# Patient Record
Sex: FEMALE | Race: WHITE | Employment: UNEMPLOYED | URBAN - METROPOLITAN AREA
[De-identification: names, ages, dates, MRNs, and addresses within clinical notes are randomized per-mention and may not be internally consistent; named-entity substitution may affect disease eponyms.]

---

## 2017-03-07 ENCOUNTER — OFFICE VISIT (OUTPATIENT)
Dept: PRIMARY CARE CLINIC | Age: 37
End: 2017-03-07

## 2017-03-07 VITALS
SYSTOLIC BLOOD PRESSURE: 122 MMHG | DIASTOLIC BLOOD PRESSURE: 80 MMHG | BODY MASS INDEX: 42.14 KG/M2 | WEIGHT: 229 LBS | HEIGHT: 62 IN

## 2017-03-07 DIAGNOSIS — F41.9 ANXIETY: Primary | ICD-10-CM

## 2017-03-07 DIAGNOSIS — Z13.1 SCREENING FOR DIABETES MELLITUS: ICD-10-CM

## 2017-03-07 DIAGNOSIS — E78.00 HYPERCHOLESTEROLEMIA: ICD-10-CM

## 2017-03-07 LAB
CHOLESTEROL, TOTAL: 181 MG/DL (ref 0–199)
HDLC SERPL-MCNC: 37 MG/DL (ref 40–60)
LDL CHOLESTEROL CALCULATED: 122 MG/DL
TRIGL SERPL-MCNC: 108 MG/DL (ref 0–150)
VLDLC SERPL CALC-MCNC: 22 MG/DL

## 2017-03-07 PROCEDURE — G8427 DOCREV CUR MEDS BY ELIG CLIN: HCPCS | Performed by: INTERNAL MEDICINE

## 2017-03-07 PROCEDURE — G8484 FLU IMMUNIZE NO ADMIN: HCPCS | Performed by: INTERNAL MEDICINE

## 2017-03-07 PROCEDURE — 99214 OFFICE O/P EST MOD 30 MIN: CPT | Performed by: INTERNAL MEDICINE

## 2017-03-07 PROCEDURE — G8417 CALC BMI ABV UP PARAM F/U: HCPCS | Performed by: INTERNAL MEDICINE

## 2017-03-07 PROCEDURE — 1036F TOBACCO NON-USER: CPT | Performed by: INTERNAL MEDICINE

## 2017-03-07 RX ORDER — CITALOPRAM 10 MG/1
10 TABLET ORAL DAILY
Qty: 30 TABLET | Refills: 1 | Status: SHIPPED | OUTPATIENT
Start: 2017-03-07 | End: 2018-02-12

## 2017-03-08 ENCOUNTER — TELEPHONE (OUTPATIENT)
Dept: PRIMARY CARE CLINIC | Age: 37
End: 2017-03-08

## 2017-03-08 DIAGNOSIS — R73.03 PREDIABETES: ICD-10-CM

## 2017-03-08 LAB
ESTIMATED AVERAGE GLUCOSE: 131.2 MG/DL
HBA1C MFR BLD: 6.2 %

## 2017-03-23 RX ORDER — BUPROPION HYDROCHLORIDE 300 MG/1
TABLET ORAL
Qty: 90 TABLET | Refills: 0 | Status: SHIPPED | OUTPATIENT
Start: 2017-03-23 | End: 2017-07-25 | Stop reason: SDUPTHER

## 2017-07-25 RX ORDER — BUPROPION HYDROCHLORIDE 300 MG/1
TABLET ORAL
Qty: 90 TABLET | Refills: 1 | Status: SHIPPED | OUTPATIENT
Start: 2017-07-25 | End: 2018-02-12 | Stop reason: SDUPTHER

## 2018-02-12 ENCOUNTER — OFFICE VISIT (OUTPATIENT)
Dept: PRIMARY CARE CLINIC | Age: 38
End: 2018-02-12

## 2018-02-12 VITALS
DIASTOLIC BLOOD PRESSURE: 84 MMHG | HEART RATE: 64 BPM | TEMPERATURE: 98 F | BODY MASS INDEX: 39.43 KG/M2 | WEIGHT: 215.6 LBS | SYSTOLIC BLOOD PRESSURE: 102 MMHG

## 2018-02-12 DIAGNOSIS — N39.0 URINARY TRACT INFECTION WITHOUT HEMATURIA, SITE UNSPECIFIED: Primary | ICD-10-CM

## 2018-02-12 LAB
BILIRUBIN, POC: NORMAL
BLOOD URINE, POC: NORMAL
CLARITY, POC: NORMAL
COLOR, POC: YELLOW
GLUCOSE URINE, POC: NORMAL
KETONES, POC: NORMAL
LEUKOCYTE EST, POC: NORMAL
NITRITE, POC: NORMAL
PH, POC: 5
PROTEIN, POC: NORMAL
SPECIFIC GRAVITY, POC: 1.02
UROBILINOGEN, POC: NORMAL

## 2018-02-12 PROCEDURE — G8428 CUR MEDS NOT DOCUMENT: HCPCS | Performed by: INTERNAL MEDICINE

## 2018-02-12 PROCEDURE — 99214 OFFICE O/P EST MOD 30 MIN: CPT | Performed by: INTERNAL MEDICINE

## 2018-02-12 PROCEDURE — 1036F TOBACCO NON-USER: CPT | Performed by: INTERNAL MEDICINE

## 2018-02-12 PROCEDURE — 81002 URINALYSIS NONAUTO W/O SCOPE: CPT | Performed by: INTERNAL MEDICINE

## 2018-02-12 PROCEDURE — G8417 CALC BMI ABV UP PARAM F/U: HCPCS | Performed by: INTERNAL MEDICINE

## 2018-02-12 PROCEDURE — G8484 FLU IMMUNIZE NO ADMIN: HCPCS | Performed by: INTERNAL MEDICINE

## 2018-02-12 RX ORDER — SULFAMETHOXAZOLE AND TRIMETHOPRIM 800; 160 MG/1; MG/1
TABLET ORAL
Qty: 6 TABLET | Refills: 0 | Status: SHIPPED | OUTPATIENT
Start: 2018-02-12 | End: 2018-11-30

## 2018-02-12 RX ORDER — BUPROPION HYDROCHLORIDE 300 MG/1
TABLET ORAL
Qty: 90 TABLET | Refills: 3 | Status: SHIPPED | OUTPATIENT
Start: 2018-02-12

## 2018-02-12 RX ORDER — PHENAZOPYRIDINE HYDROCHLORIDE 200 MG/1
200 TABLET, FILM COATED ORAL 3 TIMES DAILY PRN
Qty: 6 TABLET | Refills: 0 | Status: SHIPPED | OUTPATIENT
Start: 2018-02-12 | End: 2018-02-15

## 2018-02-12 NOTE — PROGRESS NOTES
Char Barr is a 42 y.o. female presenting today with c/o    HPI:  Patient complains of a 2 day(s) history of dysuria, frequency and suprapubic pressure. She denies urinary incontinence, fever, nausea/vomiting, vaginal symptoms and flank pain. Sexually active: Yes - 1 partner. Relevant medical history: none. Treatment to date: increased fluids. Review of Systems - comprehensive review of systems negative except as noted in HPI    No Known Allergies    Past Medical History:   Diagnosis Date    Depression     anxiety    Fracture     left arm closed reduction    Headaches, tension-type     Postpartum depression     Prediabetes 3/8/2017    Thyroid activity decreased     hyperthyroid  but  dr watching post pregnancy       Past Surgical History:   Procedure Laterality Date     SECTION      FOREARM FRACTURE SURGERY  10/2011    OTHER SURGICAL HISTORY  10/19/2011    orif r promixal ulna and distal humerus    PILONIDAL CYST EXCISION         Family History   Problem Relation Age of Onset    Thyroid Disease Father     Thyroid Disease Sister      graves    High Cholesterol Brother        Social History     Social History    Marital status:      Spouse name: N/A    Number of children: N/A    Years of education: N/A     Occupational History    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 No current facility-administered medications on file prior to visit. Vitals:    02/12/18 1340   BP: 102/84   Pulse: 64   Temp: 98 °F (36.7 °C)         Wt Readings from Last 3 Encounters:   02/12/18 215 lb 9.6 oz (97.8 kg)   03/07/17 229 lb (103.9 kg)   05/03/16 230 lb (104.3 kg)     BP Readings from Last 3 Encounters:   02/12/18 102/84   03/07/17 122/80   05/03/16 112/74         /84 (Site: Left Arm, Position: Sitting, Cuff Size: Large Adult)   Pulse 64   Temp 98 °F (36.7 °C)   Wt 215 lb 9.6 oz (97.8 kg)   BMI 39.43 kg/m²   General appearance: alert and appears stated age  Throat: lips, mucosa, and tongue normal; teeth and gums normal  Neck: no adenopathy and thyroid not enlarged, symmetric, no tenderness/mass/nodules  Back: symmetric, no curvature. ROM normal. No CVA tenderness. Lungs: clear to auscultation bilaterally  Heart: regular rate and rhythm, S1, S2 normal, no murmur, click, rub or gallop  Abdomen: soft, mild suprapubic tenderness. No masses  Skin: Skin is warm and dry. [facility]: normal mood and affect. speech is normal and behavior is normal. Judgment, cognition and memory are normal.         Assessment and Plan  1. Urinary tract infection without hematuria, site unspecified  cx sent.  Treat empirically bactrim based on last UTI  cx results 2016  Follow up worsening/persistent sx  - POCT Urinalysis no Micro  - URINE CULTURE

## 2018-02-15 LAB
ORGANISM: ABNORMAL
URINE CULTURE, ROUTINE: ABNORMAL
URINE CULTURE, ROUTINE: ABNORMAL

## 2018-02-26 RX ORDER — BUPROPION HYDROCHLORIDE 300 MG/1
TABLET ORAL
Qty: 90 TABLET | Refills: 3 | Status: SHIPPED | OUTPATIENT
Start: 2018-02-26 | End: 2018-11-30

## 2018-11-30 ENCOUNTER — OFFICE VISIT (OUTPATIENT)
Dept: ORTHOPEDIC SURGERY | Age: 38
End: 2018-11-30
Payer: COMMERCIAL

## 2018-11-30 ENCOUNTER — TELEPHONE (OUTPATIENT)
Dept: ORTHOPEDIC SURGERY | Age: 38
End: 2018-11-30

## 2018-11-30 VITALS — WEIGHT: 215.61 LBS | RESPIRATION RATE: 19 BRPM | BODY MASS INDEX: 39.68 KG/M2 | HEIGHT: 62 IN

## 2018-11-30 DIAGNOSIS — S42.411D CLOSED SUPRACONDYLAR FRACTURE OF RIGHT HUMERUS WITH ROUTINE HEALING, SUBSEQUENT ENCOUNTER: ICD-10-CM

## 2018-11-30 DIAGNOSIS — G56.21 LESION OF RIGHT ULNAR NERVE: ICD-10-CM

## 2018-11-30 DIAGNOSIS — M25.521 RIGHT ELBOW PAIN: Primary | ICD-10-CM

## 2018-11-30 PROCEDURE — G8427 DOCREV CUR MEDS BY ELIG CLIN: HCPCS | Performed by: ORTHOPAEDIC SURGERY

## 2018-11-30 PROCEDURE — 99214 OFFICE O/P EST MOD 30 MIN: CPT | Performed by: ORTHOPAEDIC SURGERY

## 2018-11-30 PROCEDURE — G8417 CALC BMI ABV UP PARAM F/U: HCPCS | Performed by: ORTHOPAEDIC SURGERY

## 2018-11-30 PROCEDURE — 1036F TOBACCO NON-USER: CPT | Performed by: ORTHOPAEDIC SURGERY

## 2018-11-30 PROCEDURE — G8484 FLU IMMUNIZE NO ADMIN: HCPCS | Performed by: ORTHOPAEDIC SURGERY

## 2018-11-30 NOTE — PROGRESS NOTES
Chief Complaint:  Elbow Pain (RT ELBOW PAIN)      History of Present of Illness: The patient returns and reports that she has continued to be very active and still plays tennis. Her original injury with elbow reconstruction was back in 2011 with ORIF of the right distal humerus intra-articular fracture and ORIF of the right ulna shaft. More recently she states about 3 weeks ago she is playing tennis and when she did a backhand she felt a painful pop in the posterior aspect of the elbow. She has had some tingling and shooting discomfort down into the ring and small fingers. As we had diagnosed her previously with a mild and chronic cubital tunnel syndrome, she is routinely able to control any tingling with activity and positional modifications. Review of Systems  Pertinent items are noted in HPI  Denies fever, chills, confusion, bowel/bladder active change. Review of systems reviewed from Patient History Form dated on 11/30/18 and available in the patient's chart under the Media tab. Examination:  On exam today the incision is nicely healed. She has full range of motion of the forearm and elbow but she lacks the final 25° of extension. Forceful extension is where she has most of her tenderness. As I palpate over the medial elbow there is no dramatic tenderness over the course of the anticipated transposed ulnar nerve at the elbow. Fingers are perfused and with only some mild tingling into the ulnar nerve distribution. There is good firing of the intrinsics. Radiology:     X-rays obtained and reviewed in office:  Views 3 views  Location right elbow  Impression x-rays demonstrate well-maintained alignment of the healed distal humerus and ulna shaft fractures. There is an anatomic alignment at the radiocapitellar and ulnohumeral joints. On the lateral view there is some ossification along the triceps insertion at the proximal ulna.   On the AP view there is the question of calcific densities within either the coronoid or the olecranon fossa. Orders Placed This Encounter   Procedures    XR ELBOW RIGHT (MIN 3 VIEWS)    CT ELBOW RIGHT WO CONTRAST     Scheduling Instructions:      [facility] [place] [id number] [facility]      [phone]            Carly Krishnan2 #:      TIME AND DATE TBD      PLEASE CALL PATIENT ONCE APPROVED TO SCHEDULE             Remember that it may take several business days to pre-cert your MRI through your insurance. Our office will contact you as soon as we have the approval.             We will not give any test results over the phone. Please call [phone] once you have your test day and time to schedule a follow up with Dr. Cooper Osorio. Order Specific Question:   Reason for exam:     Answer:   EVALUATE FOR LOOSE BODIES       Impression:  Encounter Diagnoses   Name Primary?  Right elbow pain Yes    Closed supracondylar fracture of right humerus with routine healing, subsequent encounter     Lesion of right ulnar nerve          Treatment Plan:  I'm pleased with the patient has maintained a very active lifestyle and that thus far her posttraumatic cubital tunnel syndrome has been manageable. However, based on her recent experience I have raised the question of whether she may have some posttraumatic osteophyte formation or even loose bodies that long-term could continue to give her problems. I certainly think her elbow is stable and she is not necessarily experiencing any rapidly progressing ulnar neuropathy. I've recommended we obtain at her convenience a CT scan of the right elbow to rule out any loose body. I will see her back after the CT scan to discuss the findings and options. Please note that this transcription was created using voice recognition software. Any errors are unintentional and may be due to voice recognition transcription.

## 2018-11-30 NOTE — TELEPHONE ENCOUNTER
Spoke to patient and informed them that their MRI/CT has been authorized and that they can call and schedule scan at their convenience. Also told them that they can call and schedule a f/u with Dr. Tigist Langford once they have MRI/CT scheduled, leaving at least 2-3 days for our office to receive their results.

## 2018-12-13 ENCOUNTER — OFFICE VISIT (OUTPATIENT)
Dept: ORTHOPEDIC SURGERY | Age: 38
End: 2018-12-13
Payer: COMMERCIAL

## 2018-12-13 VITALS — BODY MASS INDEX: 39.68 KG/M2 | HEIGHT: 62 IN | WEIGHT: 215.61 LBS

## 2018-12-13 DIAGNOSIS — M24.021 LOOSE BODY IN ELBOW JOINT, RIGHT: ICD-10-CM

## 2018-12-13 DIAGNOSIS — M65.9 SYNOVITIS OF ELBOW: ICD-10-CM

## 2018-12-13 DIAGNOSIS — M19.121 POST-TRAUMATIC OSTEOARTHRITIS OF RIGHT ELBOW: ICD-10-CM

## 2018-12-13 PROCEDURE — 1036F TOBACCO NON-USER: CPT | Performed by: ORTHOPAEDIC SURGERY

## 2018-12-13 PROCEDURE — 99214 OFFICE O/P EST MOD 30 MIN: CPT | Performed by: ORTHOPAEDIC SURGERY

## 2018-12-13 PROCEDURE — G8417 CALC BMI ABV UP PARAM F/U: HCPCS | Performed by: ORTHOPAEDIC SURGERY

## 2018-12-13 PROCEDURE — G8484 FLU IMMUNIZE NO ADMIN: HCPCS | Performed by: ORTHOPAEDIC SURGERY

## 2018-12-13 PROCEDURE — G8427 DOCREV CUR MEDS BY ELIG CLIN: HCPCS | Performed by: ORTHOPAEDIC SURGERY

## 2018-12-13 NOTE — PROGRESS NOTES
Primary?  Loose body in elbow joint, right     Post-traumatic osteoarthritis of right elbow     Synovitis of elbow          Treatment Plan:  Right elbow posttraumatic arthritis with elements of synovitis and posterior loose bodies. I talked with her about her options moving forward and while I do not think that these fragments are going to resolve on their own, I think she can decide electively how she would like to approach this. Discussing surgical options one could make a case for open versus arthroscopic management. I've offered her the option of an elective right elbow arthroscopy with extensive debridement of the bony structures, synovectomy, and removal of posterior loose bodies. She understands the possibility of converting to an open arthrotomy if any soft tissue changes or scar formation precludes the ability of a successful arthroscopic procedure. We would anticipate using early postop therapy. In addition I cannot promise complete restoration of full range of motion but I would be hopeful that her comfort would improve with forceful extension of the elbow. Given the anteriorly transposed ulnar nerve careful visualization for the proximal anteromedial portal would be necessary. We discussed the risks, benefits, limitations, alternatives, and postop recovery following the proposed procedure. We will begin the process of scheduling surgery if there are no other preoperative medical contraindications. Please note that this transcription was created using voice recognition software. Any errors are unintentional and may be due to voice recognition transcription.

## 2019-04-24 DIAGNOSIS — M19.121 POST-TRAUMATIC OSTEOARTHRITIS OF RIGHT ELBOW: ICD-10-CM

## 2019-04-24 DIAGNOSIS — M24.021 LOOSE BODY IN ELBOW JOINT, RIGHT: Primary | ICD-10-CM

## 2019-04-24 DIAGNOSIS — M65.9 SYNOVITIS OF ELBOW: ICD-10-CM

## 2019-04-24 DIAGNOSIS — G56.21 LESION OF RIGHT ULNAR NERVE: ICD-10-CM

## 2019-04-25 ENCOUNTER — TELEPHONE (OUTPATIENT)
Dept: ORTHOPEDIC SURGERY | Age: 39
End: 2019-04-25

## 2019-05-08 NOTE — PROGRESS NOTES
Ayla Bre    Age 34 y.o.    female    1980    MRN [id number]    Date___________   Arrival Time_____________  OR Time____________Duration____     Procedure(s):  VIDEO ARTHROSCOPY RIGHT ELBOW, EXTENSIVE DEBRIDEMENT, SYNOVECTOMY AND REMOVAL OF LOOSE BODIES  POSSIBLE OPEN ARTHROTOMY    Surgeon  ________________________________  [facility]   General   [place]       Phone [phone] (home)       [facility]  Date  [address]  Cell Work  ______________________________________________________________________________________________________________________________________________________________________________________________________________________________________________________________________________________________________________________________________________________________    PCP__________________________Phone__________________________________      H&P__________________Bringing      Chart              Epic    DOS           Called_______  EKG__________________Bringing      Chart              Epic    DOS           Called_______  LAB__________________ Bringing      Chart              Epic    DOS           Called_______  CardiacClearance _______Bringing      Chart              Epic    DOS           Called_______      Cardiologist________________________ Phone___________________________      ? Holiness concerns / Waiver on Chart            [employer]________________  ? Pre-op Instructions Given [place]          _________________________________  ? Directions to Surgery Center                          _________________________________  ? Transportation Home_______________      _________________________________  ?  Crutches/Walker__________________        _________________________________      ________Pre-op Orders   _______Transcribed    _______Wt.  ________Pharmacy          _______SCD  ______VTE     ______Beta Blocker  ________Consent

## 2019-05-08 NOTE — PROGRESS NOTES
Obstructive Sleep Apnea (DEBRA) Screening     Patient:  Eber Suazo    YOB: 1980      Medical Record #:  [id number]                     Date:  5/8/2019     1. Are you a loud and/or regular snorer? []  Yes       [x] No    2. Have you been observed to gasp or stop breathing during sleep? []  Yes       [x] No    3. Do you feel tired or groggy upon awakening or do you awaken with a headache?           []  Yes       [x] No    4. Are you often tired or fatigued during the wake time hours? []  Yes       [x] No    5. Do you fall asleep sitting, reading, watching TV or driving? []  Yes       [x] No    6. Do you often have problems with memory or concentration? []  Yes       [x] No    **If patient's score is ? 3 they are considered high risk for DEBRA. Notify the anesthesiologist of the high risk and document in focus note. Note:  If the patient's BMI is more than 35 kg m¯² , has neck circumference > 40 cm, and/or high blood pressure the risk is greater (© American Sleep Apnea Association, 2006).

## 2019-05-14 NOTE — H&P
I have reviewed the H&P and examined the patient and find no relevant changes. I have reviewed with the patient and/or family the risks, benefits, and alternatives to the procedure(s). Past Medical History:   Diagnosis Date    Depression     anxiety    Fracture ~1986    left arm closed reduction    Headaches, tension-type     Postpartum depression     Prediabetes 3/8/2017    Thyroid activity decreased     hyperthyroid  but  dr tran post pregnancy     No current facility-administered medications on file prior to encounter.       Current Outpatient Medications on File Prior to Encounter   Medication Sig Dispense Refill    buPROPion (WELLBUTRIN XL) 300 MG extended release tablet TAKE ONE TABLET BY MOUTH EVERY MORNING 90 tablet 3         AYDIN Hairston

## 2019-05-14 NOTE — OP NOTE
[facility]  Procedure Note  [address] [facility] TONYA Glez  05/15/2019    Pre-operative Diagnosis:Right Elbow posttraumatic arthritis with loose bodies and synovitis    Post-operative Diagnosis: same    Procedure:  1. Right Elbow arthroscopy with extensive debridement    2. Right Elbow arthroscopic synovectomy    3. Arthroscopic removal loose bodies Right Elbow > 7mm    Surgeon: Cindra Boas    Anesthesia:  General    Complications:  None    INDICATIONS FOR PROCEDURE: The patient has elbow pain, mechanical locking, stiffness and clinical and radiographic suggestion of arthritis, synovitis, likely loose bodies and failure to respond to conservative measures. She has the history of a complex right elbow injury with internal fixation of the distal humerus and the ulna. The patient understood well the risks, benefits, limitations, healing process, postoperative rehabilitation and chance of recurrence. DESCRIPTION OF PROCEDURE: The patient was given preoperative antibiotics and brought to the operating room. The patient was anesthetized with general anesthesia and placed in the lateral decubitus position with a bean bag and all dependent areas carefully protected. The arm was carefully held with a well-padded tourniquet and an elbow arthroscopy arm driscoll. The arm was prepped and draped in a standard fashion and the arm exsanguinated and the tourniquet elevated to 250 mmHg. The elbow was then injected with sterile lactated Ringers solution and the proximal anteromedial portal was then used with careful attention to protect any ulnar nerve. Of note, slightly increased size of portal was made at the skin for better visualization and protection of the ulnar nerve given that she had previously had transposition of the ulnar nerve during her elbow reconstruction. Visualization revealed post-traumatic fibrous tissue and extensive synovitis.  Then a separate proximal anterolateral portal was then used to perform a complete synovectomy and removal of widespread fibrous bands along the anterior capsule and radiocapitellar surface. Mild spurring was seen along the coronoid process which was also debrided. The coronoid fossa was also extensively debrided. Final pictures were taken in the anterior compartment. Next a proximal posterocentral portal was used to visualize extensive synovitis in the posterior aspect of the elbow as well as several loose bodies adherent in the fossa and embedded within fibrous tissue. Another proximal posterolateral and posterolateral portal was also used intermittently with both shaver and cordelia to perform complete synovectomy, removal of loose bodies measuring 7mm diameter and debridement and shaving and deepening of the fossa in the olecranon fossa area. The olecranon tip was then also debrided. There was some moderate osteoarthrosis and spurring on the posteromedial side which was judiciously and very carefully debrided with protection of the ulnar nerve at all times. Excellent elbow extension and flexion was then noted. Final pictures were taken and printed and the plan was for the loose bodies and synovium to be sent to Pathology. Portals were closed with nylon suture and infiltrated with 0.50% plain Marcaine. A sterile dressing and a bulky but absorbative bandage was applied and the patient was then taken to the recovery room in stable condition with good perfusion of the finger tips.       Yomi Jordan

## 2019-05-15 ENCOUNTER — HOSPITAL ENCOUNTER (OUTPATIENT)
Age: 39
Setting detail: OUTPATIENT SURGERY
Discharge: HOME | End: 2019-05-15
Attending: ORTHOPAEDIC SURGERY | Admitting: ORTHOPAEDIC SURGERY
Payer: COMMERCIAL

## 2019-05-15 ENCOUNTER — ANESTHESIA (OUTPATIENT)
Dept: OPERATING ROOM | Age: 39
End: 2019-05-15
Payer: COMMERCIAL

## 2019-05-15 ENCOUNTER — ANESTHESIA EVENT (OUTPATIENT)
Dept: OPERATING ROOM | Age: 39
End: 2019-05-15
Payer: COMMERCIAL

## 2019-05-15 VITALS
OXYGEN SATURATION: 97 % | DIASTOLIC BLOOD PRESSURE: 61 MMHG | SYSTOLIC BLOOD PRESSURE: 126 MMHG | TEMPERATURE: 97.7 F | RESPIRATION RATE: 4 BRPM

## 2019-05-15 VITALS
OXYGEN SATURATION: 97 % | RESPIRATION RATE: 16 BRPM | WEIGHT: 210 LBS | HEIGHT: 62 IN | TEMPERATURE: 96.9 F | DIASTOLIC BLOOD PRESSURE: 82 MMHG | BODY MASS INDEX: 38.64 KG/M2 | SYSTOLIC BLOOD PRESSURE: 119 MMHG | HEART RATE: 66 BPM

## 2019-05-15 DIAGNOSIS — M65.9 SYNOVITIS OF ELBOW: ICD-10-CM

## 2019-05-15 DIAGNOSIS — M19.121 POST-TRAUMATIC OSTEOARTHRITIS OF RIGHT ELBOW: ICD-10-CM

## 2019-05-15 DIAGNOSIS — M24.021 LOOSE BODY OF RIGHT ELBOW: ICD-10-CM

## 2019-05-15 DIAGNOSIS — M24.021 LOOSE BODY IN ELBOW JOINT, RIGHT: Primary | ICD-10-CM

## 2019-05-15 LAB — PREGNANCY, URINE: NEGATIVE

## 2019-05-15 PROCEDURE — 2580000003 HC RX 258: Performed by: ORTHOPAEDIC SURGERY

## 2019-05-15 PROCEDURE — 3700000000 HC ANESTHESIA ATTENDED CARE: Performed by: ORTHOPAEDIC SURGERY

## 2019-05-15 PROCEDURE — 6360000002 HC RX W HCPCS: Performed by: NURSE ANESTHETIST, CERTIFIED REGISTERED

## 2019-05-15 PROCEDURE — 2500000003 HC RX 250 WO HCPCS: Performed by: NURSE ANESTHETIST, CERTIFIED REGISTERED

## 2019-05-15 PROCEDURE — 88304 TISSUE EXAM BY PATHOLOGIST: CPT

## 2019-05-15 PROCEDURE — 2500000003 HC RX 250 WO HCPCS: Performed by: ORTHOPAEDIC SURGERY

## 2019-05-15 PROCEDURE — 3600000004 HC SURGERY LEVEL 4 BASE: Performed by: ORTHOPAEDIC SURGERY

## 2019-05-15 PROCEDURE — 7100000000 HC PACU RECOVERY - FIRST 15 MIN: Performed by: ORTHOPAEDIC SURGERY

## 2019-05-15 PROCEDURE — 2500000003 HC RX 250 WO HCPCS: Performed by: ANESTHESIOLOGY

## 2019-05-15 PROCEDURE — 2580000003 HC RX 258: Performed by: ANESTHESIOLOGY

## 2019-05-15 PROCEDURE — 3600000014 HC SURGERY LEVEL 4 ADDTL 15MIN: Performed by: ORTHOPAEDIC SURGERY

## 2019-05-15 PROCEDURE — 6360000002 HC RX W HCPCS: Performed by: ANESTHESIOLOGY

## 2019-05-15 PROCEDURE — 2709999900 HC NON-CHARGEABLE SUPPLY: Performed by: ORTHOPAEDIC SURGERY

## 2019-05-15 PROCEDURE — 84703 CHORIONIC GONADOTROPIN ASSAY: CPT

## 2019-05-15 PROCEDURE — 6360000002 HC RX W HCPCS: Performed by: ORTHOPAEDIC SURGERY

## 2019-05-15 PROCEDURE — 6370000000 HC RX 637 (ALT 250 FOR IP): Performed by: ANESTHESIOLOGY

## 2019-05-15 PROCEDURE — 7100000001 HC PACU RECOVERY - ADDTL 15 MIN: Performed by: ORTHOPAEDIC SURGERY

## 2019-05-15 PROCEDURE — 7100000011 HC PHASE II RECOVERY - ADDTL 15 MIN: Performed by: ORTHOPAEDIC SURGERY

## 2019-05-15 PROCEDURE — 3700000001 HC ADD 15 MINUTES (ANESTHESIA): Performed by: ORTHOPAEDIC SURGERY

## 2019-05-15 PROCEDURE — 2720000010 HC SURG SUPPLY STERILE: Performed by: ORTHOPAEDIC SURGERY

## 2019-05-15 PROCEDURE — 7100000010 HC PHASE II RECOVERY - FIRST 15 MIN: Performed by: ORTHOPAEDIC SURGERY

## 2019-05-15 RX ORDER — MEPERIDINE HYDROCHLORIDE 50 MG/ML
12.5 INJECTION INTRAMUSCULAR; INTRAVENOUS; SUBCUTANEOUS EVERY 5 MIN PRN
Status: DISCONTINUED | OUTPATIENT
Start: 2019-05-15 | End: 2019-05-15 | Stop reason: HOSPADM

## 2019-05-15 RX ORDER — HYDRALAZINE HYDROCHLORIDE 20 MG/ML
5 INJECTION INTRAMUSCULAR; INTRAVENOUS EVERY 10 MIN PRN
Status: DISCONTINUED | OUTPATIENT
Start: 2019-05-15 | End: 2019-05-15 | Stop reason: HOSPADM

## 2019-05-15 RX ORDER — PROPOFOL 10 MG/ML
INJECTION, EMULSION INTRAVENOUS PRN
Status: DISCONTINUED | OUTPATIENT
Start: 2019-05-15 | End: 2019-05-15 | Stop reason: SDUPTHER

## 2019-05-15 RX ORDER — SODIUM CHLORIDE 0.9 % (FLUSH) 0.9 %
10 SYRINGE (ML) INJECTION EVERY 12 HOURS SCHEDULED
Status: DISCONTINUED | OUTPATIENT
Start: 2019-05-15 | End: 2019-05-15 | Stop reason: HOSPADM

## 2019-05-15 RX ORDER — ONDANSETRON 2 MG/ML
4 INJECTION INTRAMUSCULAR; INTRAVENOUS PRN
Status: DISCONTINUED | OUTPATIENT
Start: 2019-05-15 | End: 2019-05-15 | Stop reason: HOSPADM

## 2019-05-15 RX ORDER — DEXAMETHASONE SODIUM PHOSPHATE 10 MG/ML
INJECTION INTRAMUSCULAR; INTRAVENOUS PRN
Status: DISCONTINUED | OUTPATIENT
Start: 2019-05-15 | End: 2019-05-15 | Stop reason: SDUPTHER

## 2019-05-15 RX ORDER — LIDOCAINE HYDROCHLORIDE 20 MG/ML
INJECTION, SOLUTION INFILTRATION; PERINEURAL PRN
Status: DISCONTINUED | OUTPATIENT
Start: 2019-05-15 | End: 2019-05-15 | Stop reason: SDUPTHER

## 2019-05-15 RX ORDER — MORPHINE SULFATE 2 MG/ML
1 INJECTION, SOLUTION INTRAMUSCULAR; INTRAVENOUS EVERY 5 MIN PRN
Status: DISCONTINUED | OUTPATIENT
Start: 2019-05-15 | End: 2019-05-15 | Stop reason: HOSPADM

## 2019-05-15 RX ORDER — PROMETHAZINE HYDROCHLORIDE 25 MG/ML
6.25 INJECTION, SOLUTION INTRAMUSCULAR; INTRAVENOUS
Status: DISCONTINUED | OUTPATIENT
Start: 2019-05-15 | End: 2019-05-15 | Stop reason: HOSPADM

## 2019-05-15 RX ORDER — ROCURONIUM BROMIDE 10 MG/ML
INJECTION, SOLUTION INTRAVENOUS PRN
Status: DISCONTINUED | OUTPATIENT
Start: 2019-05-15 | End: 2019-05-15 | Stop reason: SDUPTHER

## 2019-05-15 RX ORDER — MAGNESIUM HYDROXIDE 1200 MG/15ML
LIQUID ORAL CONTINUOUS PRN
Status: COMPLETED | OUTPATIENT
Start: 2019-05-15 | End: 2019-05-15

## 2019-05-15 RX ORDER — FENTANYL CITRATE 50 UG/ML
INJECTION, SOLUTION INTRAMUSCULAR; INTRAVENOUS PRN
Status: DISCONTINUED | OUTPATIENT
Start: 2019-05-15 | End: 2019-05-15 | Stop reason: SDUPTHER

## 2019-05-15 RX ORDER — ONDANSETRON 2 MG/ML
INJECTION INTRAMUSCULAR; INTRAVENOUS PRN
Status: DISCONTINUED | OUTPATIENT
Start: 2019-05-15 | End: 2019-05-15 | Stop reason: SDUPTHER

## 2019-05-15 RX ORDER — SODIUM CHLORIDE, SODIUM LACTATE, POTASSIUM CHLORIDE, CALCIUM CHLORIDE 600; 310; 30; 20 MG/100ML; MG/100ML; MG/100ML; MG/100ML
INJECTION, SOLUTION INTRAVENOUS CONTINUOUS
Status: DISCONTINUED | OUTPATIENT
Start: 2019-05-15 | End: 2019-05-15 | Stop reason: HOSPADM

## 2019-05-15 RX ORDER — SODIUM CHLORIDE 0.9 % (FLUSH) 0.9 %
10 SYRINGE (ML) INJECTION PRN
Status: DISCONTINUED | OUTPATIENT
Start: 2019-05-15 | End: 2019-05-15 | Stop reason: HOSPADM

## 2019-05-15 RX ORDER — ACETAMINOPHEN 500 MG
1000 TABLET ORAL ONCE
Status: CANCELLED | OUTPATIENT
Start: 2019-05-15

## 2019-05-15 RX ORDER — ONDANSETRON 4 MG/1
4 TABLET, FILM COATED ORAL EVERY 8 HOURS PRN
Qty: 10 TABLET | Refills: 1 | Status: SHIPPED | OUTPATIENT
Start: 2019-05-15

## 2019-05-15 RX ORDER — LIDOCAINE HYDROCHLORIDE 10 MG/ML
0.3 INJECTION, SOLUTION EPIDURAL; INFILTRATION; INTRACAUDAL; PERINEURAL
Status: COMPLETED | OUTPATIENT
Start: 2019-05-15 | End: 2019-05-15

## 2019-05-15 RX ORDER — MORPHINE SULFATE 2 MG/ML
2 INJECTION, SOLUTION INTRAMUSCULAR; INTRAVENOUS EVERY 5 MIN PRN
Status: DISCONTINUED | OUTPATIENT
Start: 2019-05-15 | End: 2019-05-15 | Stop reason: HOSPADM

## 2019-05-15 RX ORDER — OXYCODONE HYDROCHLORIDE AND ACETAMINOPHEN 5; 325 MG/1; MG/1
2 TABLET ORAL PRN
Status: COMPLETED | OUTPATIENT
Start: 2019-05-15 | End: 2019-05-15

## 2019-05-15 RX ORDER — MIDAZOLAM HYDROCHLORIDE 1 MG/ML
INJECTION INTRAMUSCULAR; INTRAVENOUS PRN
Status: DISCONTINUED | OUTPATIENT
Start: 2019-05-15 | End: 2019-05-15 | Stop reason: SDUPTHER

## 2019-05-15 RX ORDER — OXYCODONE HYDROCHLORIDE AND ACETAMINOPHEN 5; 325 MG/1; MG/1
1 TABLET ORAL PRN
Status: COMPLETED | OUTPATIENT
Start: 2019-05-15 | End: 2019-05-15

## 2019-05-15 RX ORDER — DIPHENHYDRAMINE HYDROCHLORIDE 50 MG/ML
12.5 INJECTION INTRAMUSCULAR; INTRAVENOUS
Status: DISCONTINUED | OUTPATIENT
Start: 2019-05-15 | End: 2019-05-15 | Stop reason: HOSPADM

## 2019-05-15 RX ORDER — IBUPROFEN 600 MG/1
600 TABLET ORAL EVERY 6 HOURS PRN
Qty: 60 TABLET | Refills: 1 | Status: SHIPPED | OUTPATIENT
Start: 2019-05-15

## 2019-05-15 RX ORDER — SODIUM CHLORIDE, SODIUM LACTATE, POTASSIUM CHLORIDE, AND CALCIUM CHLORIDE .6; .31; .03; .02 G/100ML; G/100ML; G/100ML; G/100ML
IRRIGANT IRRIGATION PRN
Status: DISCONTINUED | OUTPATIENT
Start: 2019-05-15 | End: 2019-05-15 | Stop reason: ALTCHOICE

## 2019-05-15 RX ORDER — BUPIVACAINE HYDROCHLORIDE 5 MG/ML
INJECTION, SOLUTION EPIDURAL; INTRACAUDAL PRN
Status: DISCONTINUED | OUTPATIENT
Start: 2019-05-15 | End: 2019-05-15 | Stop reason: ALTCHOICE

## 2019-05-15 RX ORDER — OXYCODONE HYDROCHLORIDE AND ACETAMINOPHEN 5; 325 MG/1; MG/1
1 TABLET ORAL EVERY 6 HOURS PRN
Qty: 20 TABLET | Refills: 0 | Status: SHIPPED | OUTPATIENT
Start: 2019-05-15 | End: 2019-05-22

## 2019-05-15 RX ORDER — LABETALOL HYDROCHLORIDE 5 MG/ML
5 INJECTION, SOLUTION INTRAVENOUS EVERY 10 MIN PRN
Status: DISCONTINUED | OUTPATIENT
Start: 2019-05-15 | End: 2019-05-15 | Stop reason: HOSPADM

## 2019-05-15 RX ADMIN — PROPOFOL 200 MG: 10 INJECTION, EMULSION INTRAVENOUS at 08:55

## 2019-05-15 RX ADMIN — FENTANYL CITRATE 100 MCG: 50 INJECTION, SOLUTION INTRAMUSCULAR; INTRAVENOUS at 08:48

## 2019-05-15 RX ADMIN — ROCURONIUM BROMIDE 30 MG: 10 SOLUTION INTRAVENOUS at 08:55

## 2019-05-15 RX ADMIN — ONDANSETRON 4 MG: 2 INJECTION INTRAMUSCULAR; INTRAVENOUS at 08:44

## 2019-05-15 RX ADMIN — LIDOCAINE HYDROCHLORIDE 0.1 ML: 10 INJECTION, SOLUTION EPIDURAL; INFILTRATION; INTRACAUDAL; PERINEURAL at 07:30

## 2019-05-15 RX ADMIN — HYDROMORPHONE HYDROCHLORIDE 0.5 MG: 1 INJECTION, SOLUTION INTRAMUSCULAR; INTRAVENOUS; SUBCUTANEOUS at 10:25

## 2019-05-15 RX ADMIN — FENTANYL CITRATE 50 MCG: 50 INJECTION, SOLUTION INTRAMUSCULAR; INTRAVENOUS at 09:07

## 2019-05-15 RX ADMIN — SODIUM CHLORIDE, POTASSIUM CHLORIDE, SODIUM LACTATE AND CALCIUM CHLORIDE: 600; 310; 30; 20 INJECTION, SOLUTION INTRAVENOUS at 09:48

## 2019-05-15 RX ADMIN — SUGAMMADEX 191 MG: 100 INJECTION, SOLUTION INTRAVENOUS at 09:50

## 2019-05-15 RX ADMIN — SODIUM CHLORIDE, POTASSIUM CHLORIDE, SODIUM LACTATE AND CALCIUM CHLORIDE: 600; 310; 30; 20 INJECTION, SOLUTION INTRAVENOUS at 07:48

## 2019-05-15 RX ADMIN — MIDAZOLAM HYDROCHLORIDE 2 MG: 2 INJECTION, SOLUTION INTRAMUSCULAR; INTRAVENOUS at 08:44

## 2019-05-15 RX ADMIN — LIDOCAINE HYDROCHLORIDE 60 MG: 20 INJECTION, SOLUTION INFILTRATION; PERINEURAL at 08:51

## 2019-05-15 RX ADMIN — HYDROMORPHONE HYDROCHLORIDE 0.5 MG: 1 INJECTION, SOLUTION INTRAMUSCULAR; INTRAVENOUS; SUBCUTANEOUS at 10:32

## 2019-05-15 RX ADMIN — Medication 2 G: at 09:00

## 2019-05-15 RX ADMIN — DEXAMETHASONE SODIUM PHOSPHATE 10 MG: 10 INJECTION INTRAMUSCULAR; INTRAVENOUS at 09:02

## 2019-05-15 RX ADMIN — OXYCODONE HYDROCHLORIDE AND ACETAMINOPHEN 2 TABLET: 5; 325 TABLET ORAL at 10:41

## 2019-05-15 RX ADMIN — FENTANYL CITRATE 25 MCG: 50 INJECTION, SOLUTION INTRAMUSCULAR; INTRAVENOUS at 09:48

## 2019-05-15 ASSESSMENT — PULMONARY FUNCTION TESTS
PIF_VALUE: 15
PIF_VALUE: 7
PIF_VALUE: 17
PIF_VALUE: 15
PIF_VALUE: 10
PIF_VALUE: 5
PIF_VALUE: 17
PIF_VALUE: 15
PIF_VALUE: 25
PIF_VALUE: 2
PIF_VALUE: 1
PIF_VALUE: 26
PIF_VALUE: 14
PIF_VALUE: 2
PIF_VALUE: 23
PIF_VALUE: 1
PIF_VALUE: 19
PIF_VALUE: 18
PIF_VALUE: 17
PIF_VALUE: 15
PIF_VALUE: 17
PIF_VALUE: 15
PIF_VALUE: 15
PIF_VALUE: 2
PIF_VALUE: 15
PIF_VALUE: 15
PIF_VALUE: 27
PIF_VALUE: 20
PIF_VALUE: 15
PIF_VALUE: 19
PIF_VALUE: 2
PIF_VALUE: 23
PIF_VALUE: 18
PIF_VALUE: 17
PIF_VALUE: 25
PIF_VALUE: 15
PIF_VALUE: 22
PIF_VALUE: 15
PIF_VALUE: 15
PIF_VALUE: 2
PIF_VALUE: 2
PIF_VALUE: 15
PIF_VALUE: 15
PIF_VALUE: 19
PIF_VALUE: 15
PIF_VALUE: 24
PIF_VALUE: 15
PIF_VALUE: 0
PIF_VALUE: 10
PIF_VALUE: 15
PIF_VALUE: 15
PIF_VALUE: 2
PIF_VALUE: 24
PIF_VALUE: 15
PIF_VALUE: 15
PIF_VALUE: 2
PIF_VALUE: 22
PIF_VALUE: 15
PIF_VALUE: 17
PIF_VALUE: 2
PIF_VALUE: 2
PIF_VALUE: 1
PIF_VALUE: 15
PIF_VALUE: 15
PIF_VALUE: 1
PIF_VALUE: 2
PIF_VALUE: 15
PIF_VALUE: 14
PIF_VALUE: 15
PIF_VALUE: 17
PIF_VALUE: 1
PIF_VALUE: 15
PIF_VALUE: 17

## 2019-05-15 ASSESSMENT — PAIN - FUNCTIONAL ASSESSMENT: PAIN_FUNCTIONAL_ASSESSMENT: 0-10

## 2019-05-15 ASSESSMENT — PAIN SCALES - GENERAL
PAINLEVEL_OUTOF10: 7
PAINLEVEL_OUTOF10: 5
PAINLEVEL_OUTOF10: 7
PAINLEVEL_OUTOF10: 7

## 2019-05-15 NOTE — ANESTHESIA PRE PROCEDURE
humerus S42.413A    Pain in joint, upper arm M25.529    Pain in joint, hand M25.549    Lesion of ulnar nerve G56.20    Prediabetes R73.03    Loose body in elbow joint, right M24.021    Post-traumatic osteoarthritis of right elbow M19.121    Synovitis of elbow M65.9       Past Medical History:        Diagnosis Date    Depression     anxiety    Fracture ~    left arm closed reduction    Headaches, tension-type     Postpartum depression     Prediabetes 3/8/2017    Thyroid activity decreased     hyperthyroid  but  dr watching post pregnancy       Past Surgical History:        Procedure Laterality Date     SECTION      x 4    OTHER SURGICAL HISTORY Right 10/19/2011    orif r promixal ulna and distal humerus    PILONIDAL CYST EXCISION         Social History:    Social History     Tobacco Use    Smoking status: Never Smoker    Smokeless tobacco: Never Used   Substance Use Topics    Alcohol use: No                                Counseling given: Not Answered      Vital Signs (Current):   Vitals:    19 1140 05/15/19 0717   BP:  122/82   Pulse:  68   Resp:  16   Temp:  97.7 °F (36.5 °C)   TempSrc:  Temporal   SpO2:  97%   Weight: 210 lb (95.3 kg) 210 lb (95.3 kg)   Height: 5' 2\" (1.575 m) 5' 2\" (1.575 m)                                              BP Readings from Last 3 Encounters:   05/15/19 122/82   18 102/84   17 122/80       NPO Status: Time of last liquid consumption: 2300                        Time of last solid consumption: 2300                        Date of last liquid consumption: 19                        Date of last solid food consumption: 19    BMI:   Wt Readings from Last 3 Encounters:   05/15/19 210 lb (95.3 kg)   18 215 lb 9.8 oz (97.8 kg)   18 215 lb 9.8 oz (97.8 kg)     Body mass index is 38.41 kg/m².     CBC:   Lab Results   Component Value Date    WBC 6.8 2014    RBC 4.60 2014    HGB 13.3 2014    HCT 39.9 11/04/2014    MCV 86.7 11/04/2014    RDW 14.4 11/04/2014     11/04/2014       CMP:   Lab Results   Component Value Date     08/04/2014    K 4.0 08/04/2014     08/04/2014    CO2 25 08/04/2014    BUN 17 08/04/2014    CREATININE 0.6 08/04/2014    GFRAA >60 08/04/2014    GFRAA >60 10/15/2011    AGRATIO 1.6 08/04/2014    LABGLOM >60 08/04/2014    GLUCOSE 83 08/04/2014    PROT 7.0 08/04/2014    PROT 8.1 10/15/2011    CALCIUM 9.5 08/04/2014    BILITOT 0.3 08/04/2014    ALKPHOS 66 08/04/2014    AST 13 08/04/2014    ALT 12 08/04/2014       POC Tests: No results for input(s): POCGLU, POCNA, POCK, POCCL, POCBUN, POCHEMO, POCHCT in the last 72 hours. Coags:   Lab Results   Component Value Date    PROTIME 12.0 01/25/2011    INR 1.10 01/25/2011       HCG (If Applicable):   Lab Results   Component Value Date    PREGTESTUR neg 05/03/2016        ABGs: No results found for: PHART, PO2ART, SOE7ECR, HOR6ZBK, BEART, H1POJENA     Type & Screen (If Applicable):  Lab Results   Component Value Date    LABABO O 12/30/2013    [facility] Rh Positive 12/30/2013       Anesthesia Evaluation  Patient summary reviewed and Nursing notes reviewed  Airway: Mallampati: III  TM distance: <3 FB   Neck ROM: full  Mouth opening: > = 3 FB Dental: normal exam         Pulmonary:Negative Pulmonary ROS and normal exam  breath sounds clear to auscultation                             Cardiovascular:Negative CV ROS            Rhythm: regular  Rate: normal                    Neuro/Psych:   (+) depression/anxiety             GI/Hepatic/Renal: Neg GI/Hepatic/Renal ROS            Endo/Other:    (+) hypothyroidism::., .                 Abdominal:   (+) obese,         Vascular: negative vascular ROS. Anesthesia Plan      general     ASA 2       Induction: intravenous. MIPS: Postoperative opioids intended and Prophylactic antiemetics administered. Anesthetic plan and risks discussed with patient.       Plan discussed with CRNA.                   Anan Cook MD   5/15/2019

## 2019-05-15 NOTE — ANESTHESIA POSTPROCEDURE EVALUATION
Department of Anesthesiology  Postprocedure Note    Patient: Twila Muse  MRN: [id number]  YOB: 1980  Date of evaluation: 5/15/2019  Time:  2:14 PM     Procedure Summary     Date:  05/15/19 Room / Location:  [facility] OR 04 / [facility] OR    Anesthesia Start:  4098 Anesthesia Stop:  2786    Procedure:  VIDEO ARTHROSCOPY RIGHT ELBOW, EXTENSIVE DEBRIDEMENT, SYNOVECTOMY AND REMOVAL OF LOOSE BODIES (Right Elbow) Diagnosis:       Post-traumatic osteoarthritis of right elbow      Loose body of right elbow      Synovitis of elbow      (POST TRAUMATIC OSTEOARTHRITIS RIGHT ELBOW, LOOSE BODY IN RIGHT ELBOW, SYNOVITIS RIGHT ELBOW)    Surgeon:  Bart Browne MD Responsible Provider:  Ira Galvan MD    Anesthesia Type:  general ASA Status:  2          Anesthesia Type: general    Rosalba Phase I: Rosalba Score: 10    Rosalba Phase II: Rosalba Score: 10    Last vitals: Reviewed and per EMR flowsheets.        Anesthesia Post Evaluation    Patient location during evaluation: PACU  Patient participation: complete - patient participated  Level of consciousness: awake and alert  Pain score: 0  Airway patency: patent  Nausea & Vomiting: no nausea and no vomiting  Complications: no  Cardiovascular status: blood pressure returned to baseline  Respiratory status: acceptable  Hydration status: stable

## 2019-05-20 ENCOUNTER — HOSPITAL ENCOUNTER (OUTPATIENT)
Dept: OCCUPATIONAL THERAPY | Age: 39
Setting detail: THERAPIES SERIES
Discharge: HOME | End: 2019-05-20
Payer: COMMERCIAL

## 2019-05-20 PROCEDURE — 97165 OT EVAL LOW COMPLEX 30 MIN: CPT | Performed by: OCCUPATIONAL THERAPIST

## 2019-05-20 PROCEDURE — 97110 THERAPEUTIC EXERCISES: CPT | Performed by: OCCUPATIONAL THERAPIST

## 2019-05-20 NOTE — FLOWSHEET NOTE
[facility], [address] [place]  Phone: [phone]  Fax [phone]  Hand Therapy Daily Treatment Note  Date:  2019    Patient: Loni Gayle   : 1980   MRN: [id number]  Referring Physician: Referring Practitioner: Jyoti Bowman MD       Medical Diagnosis Information:   Diagnosis: M19.121 (ICD-10-CM) - Post-traumatic osteoarthritis of right elbow, M65.9 (ICD-10-CM) - Synovitis of elbow; G56.21 (ICD-10-CM) - Lesion of right ulnar nerve; M24.021 (ICD-10-CM) - Loose body in elbow joint, right   Treatment Diagnosis: OT:R elbow stiffness M25.621     Date of injury:fell of a horse in , surgery at that time, recently 2018, she reinjured  Date of Surgery/Type of procedure:      Elbow scope on 19                                Insurance information:OT Insurance Information:  / 116**- MED MUT - RHOAD - $6000DED - $0CP - 20PT - 100% NO AUTH   Comorbidities Affecting Functional Performance:     []Anxiety (F41.9)/Depression (F32.9)   []Diabetes Type 1(E10.65) or 2 (E11.65)   []Rheumatoid Arthritis (M05.9)  []Fibromyalgia (M79.7)  []Neuropathy(G60.9)  []Osteoarthritis(M19.91)  [x]None   []Other:    G-code:      64% QDASH SCORE APPLIED ON 19                             Date of Patient follow up with Physician:  Preferred Language for Healthcare:   [x]English       []other:  Latex Allergy:  [x]NO      []YES  RESTRICTIONS/PRECAUTIONS:   NONE  Progress Note: [x]  Yes  []  No  Next due by : Visit #10       Visit # Insurance Allowable Requires auth   1     no:[]                  yes:[]    From 19 to 2019    Pain level:  /10     SUBJECTIVE:  Patient reported deficits/history of current problem: fell of a horse in , surgery then, recently heard a pop playing tennis, a little bit of pain and stiffness, finished tennis season and then elected to have surgery.     Pain         OBJECTIVE: Reviewed/Progressed HEP activities related to strengthening, flexibility, endurance, ROM of scapular, scapulothoracic and UE control with self care, reaching, carrying, lifting, house/yardwork, driving/computer work    Manual Treatments:   [] (45274) Provided manual therapy to mobilize soft tissue/joints of the UE for the purpose of modulating pain, promoting relaxation,  increasing ROM, reducing/eliminating soft tissue swelling/inflammation/restriction, improving soft tissue extensibility and allowing for proper ROM for normal function with self care, reaching, carrying, lifting, house/yardwork, driving/computer work    Splinting:  [] Fabrication of: L-code  [] (26474) Checkout for orthotic/prosthetic use, established patient   [] (08583) Orthotic management and training (fitting and assessment)  [] Comments:    Charges:  Timed Code Treatment Minutes: 15   Total Treatment Minutes: 45   [x] EVAL (LOW) 94404   [] OT Re-eval (16541)  [] EVAL (MOD) 59615   [] EVAL (HIGH) 55802       [x] Beronica (41940) x   1  [] ZZXEO(73918)  [] NMR (29397) x     [] Estim (attended) (85242)   [] Manual ([id number]) x      [] US (59246)  [] TA (00875) x     [] Paraffin (51341)  [] ADL  (91041) x    [] Splint/L code:    [] Estim (unattended) (15177)  [] Other:  [] Fluidotherapy (54892)  [](83713) Checkout for orthotic use, established patient x      [] (32146) Orthotic mgmt & training  x       GOALS:Short Term Goals: To be achieved in: 2 weeks  1. Independent in HEP and progression per patient tolerance, in order to prevent re-injury. 2. Patient will have a decrease in pain to facilitate improvement in movement, function, and ADLs as indicated by Functional Deficits.     Long Term Goals to be achieved in 6  weeks, including patient directed goals to address identified performance deficits:  1) Pt to be independent in graded HEP progression with a good level of effort and compliance.   2) Pt to report a score of 30 % or less on the Quick DASH disability questionnaire for increased performance with carrying, moving, and handling objects. 3) Pt will demonstrate increased ROM to elbow  for improved ability to resume playing tennis, lift and carry her children. 4) Pt will demonstrate increased strength to  50% of right, MMT elbow flex functional for improved bility to resume playing tennis, lift and carry her childern. 5) Pt will have a decrease in pain to 2/10 to facilitate improvement in ability to resume tennis, complete all household duties including meal prep and cleaning. Progression Towards Functional goals:  [] Patient is progressing as expected towards functional goals listed. [] Progression is slowed due to complexities listed. [] Progression has been slowed due to co-morbidities.   [x] Plan just implemented, too soon to assess goals progression  [] Other:     ASSESSMENT:    Treatment/Activity Tolerance:  [] Patient tolerated treatment well [] Patient limited by fatique  [] Patient limited by pain  [] Patient limited by other medical complications  [] Other:     Prognosis: [] Good [] Fair  [] Poor    Patient Requires Follow-up: [x] Yes  [] No    PLAN: Recommend Occupational Therapy 1 times a week for 6 weeks (she will follow up in 2 weeks to progress HEP)  [] Continue per plan of care [] Selinda Prudent current plan (see comments)  [x] Plan of care initiated [] Hold pending MD visit [] Discharge    Plan for next session: progress HEP   Thermal modalities, functional activities and strengthening as inicated, AROM, PROM as indicated    Electronically signed by: Sue RANGEL/JOSÉ, KUSUMT [id number]

## 2019-05-20 NOTE — PLAN OF CARE
deficits/history of current problem: fell of a horse in 2011, surgery then, recently heard a pop playing tennis, a little bit of pain and stiffness, finished tennis season and then elected to have surgery.     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, stay at home mom    Recreational Activities/Meaningful Interests: TENNIS, working out    Prior Level of Function: [x] Independent with ADLs/IADLs     [] Assistance needed (describe):    Patient-Identified Primary Performance Deficits (to be addressed in POC):   [] bathing    [x] household tasks (cooking/cleaning)   [] dressing    [] self feeding   [] grooming    [] work/education   [] functional mobility   [] sleeping/rest   [] toileting/hygiene   [x] recreational activities   [] driving    [] community/social participation   [] other:     Comorbidities Affecting Functional Performance:     []Anxiety (F41.9)/Depression (F32.9)   []Diabetes Type 1(E10.65) or 2 (E11.65)   []Rheumatoid Arthritis (M05.9)  []Fibromyalgia (M79.7)  []Neuropathy(G60.9)  []Osteoarthritis(M19.91)  []None   []Other:    OBJECTIVE:   Date:  Hand Dominance:     []  Right    [] Left 5/20/2019     Objective Measures:    PAIN 6/10   Quick DASH 64%   Digits tip to DPFC in cm WNL   Thumb ROM WNL   Thumb opposition  WNL   Thumb Radial/Palmar abd ROM WNL:   Wrist ROM Ext/Flex R:WNL  L:   Rad/Uln dev ROM R:  L:   Forearm ROM  Sup/pron R:WNL  L:WNL   Elbow ROM Ext/flex R:35/110  L:+15/150   Shoulder Flex  Shoulder Abd  Shoulder IR/ER No deficits per patient   Edema in cm circumf.  elbow R:28.0  L:26.5    strength in lbs R:deferred  L:   Pinch Strengthin lbs: lat  R:deferred  L:   Pinch Strength in lbs:  3 point R:  L:     MMT: NT     Observations (including splints, bandages, incisions, scars):   Sutures intact, moderate edema noted at elbow    Sensation:  [x] No reported deficits  [] Intact to light touch    [] [place] [employer] test completed, findings as noted:  [] Other:    Palpation: NT    Functional Mobility/Transfers/Gait:  [x] Independent - no significant gait deviations  [] Assistance needed   [] Assistive device used: Falls Risk Assessment (30 days):   [x] Falls Risk assessed and no intervention required. [] Falls Risk assessed and Patient requires intervention due to being higher risk   TUG score (>12s at risk):     [] Falls education provided, including      Review Of Systems (ROS): [x]Performed Review of systems (Integumentary, CardioPulmonary, Neurological) by intake and observation. Intake form has been scanned into medical record. Patient has been instructed to contact their primary care physician regarding ROS issues if not already being addressed at this time. ASSESSMENT:   This patient presents with signs and symptoms consistent with the medical diagnosis provided by the referring physician.      Impairments (physical, cognitive and/or psychosocial):  [x] Decreased mobility   [x] Weakness    [] Hypersensitivity   [x] Pain/tenderness   [x] Edema/swelling   [] Decreased coordination (fine/gross motor)   [] Impaired body mechanics  [] Sensory loss  [] Loss of balance   [] Other:      Performance Deficits (to be addressed in plan of care):   [] Bathing    [x] Household Tasks (cooking/cleaning)   [] Dressing    [] Self Feeding   [] Grooming    [] Work/Education   [] Functional Mobility   [] Sleeping/Rest   [] Toileting/Hygiene   [x] Recreational Activities   [] Driving    [] Community/Social Participation   [] Other:     Rehab Potential:   [] Excellent [x] Good [] Fair  [] Poor     Barriers affecting rehab potential:  []Age    []Lack of Motivation   []Co-Morbidities  []Cognitive Function  []Environmental/home/work

## 2019-05-30 ENCOUNTER — OFFICE VISIT (OUTPATIENT)
Dept: ORTHOPEDIC SURGERY | Age: 39
End: 2019-05-30

## 2019-05-30 DIAGNOSIS — M19.121 POST-TRAUMATIC OSTEOARTHRITIS OF RIGHT ELBOW: Primary | ICD-10-CM

## 2019-05-30 PROCEDURE — 99024 POSTOP FOLLOW-UP VISIT: CPT | Performed by: ORTHOPAEDIC SURGERY

## 2019-05-30 NOTE — PROGRESS NOTES
Chief Complaint:  Post-Op Check (PO CK RT ELBOW SCOPE 5/15/19)      History of Present of Illness: The patient returns for the first postoperative appointment after right elbow arthroscopy. At the time of surgery she was indeed found to have some posttraumatic arthrosis mostly along the radiocapitellar joint of the elbow. However, she had a fair amount of some [employer] throughout the elbow and in the posterior aspect of the elbow she had several loose bodies and adherent osteophyte formation along the olecranon fossa. She is done very well postoperatively and did bring her images from surgery to review. Review of Systems  Pertinent items are noted in HPI  Denies fever, chills, confusion, bowel/bladder active change. Review of systems reviewed from Patient History Form dated on 11/30/2018 and available in the patient's chart under the Media tab. Examination:  And today the portal sites are healing nicely and she demonstrates a range of motion from approximately °. There is full pronation and supination. Radiology:     X-rays obtained and reviewed in office:  Views    Location    Impression      No orders of the defined types were placed in this encounter. Impression:  Encounter Diagnosis   Name Primary?  Post-traumatic osteoarthritis of right elbow Yes         Treatment Plan: Today we will go ahead and remove the sutures and apply Steri-Strips. I want to through all of the images to outline the findings. I think down the road she will be able to have a very active lifestyle that she will just simply need to find a middle ground of reasonable activity without aggravating the elbow. She does understand that with the arthritis she may always have some degree of stiffness. I will see her back for follow-up in about 6 weeks. Please note that this transcription was created using voice recognition software. Any errors are unintentional and may be due to voice recognition transcription.

## 2019-06-03 ENCOUNTER — HOSPITAL ENCOUNTER (OUTPATIENT)
Dept: OCCUPATIONAL THERAPY | Age: 39
Setting detail: THERAPIES SERIES
Discharge: HOME | End: 2019-06-03
Payer: COMMERCIAL

## 2019-06-03 PROCEDURE — 97140 MANUAL THERAPY 1/> REGIONS: CPT | Performed by: OCCUPATIONAL THERAPIST

## 2019-06-03 PROCEDURE — 97110 THERAPEUTIC EXERCISES: CPT | Performed by: OCCUPATIONAL THERAPIST

## 2019-06-03 NOTE — FLOWSHEET NOTE
[facility], [address]  Phone: [phone]  Fax [phone]  Hand Therapy Daily Treatment Note  Date:  6/3/2019    Patient: Gabby Garrison   : 1980   MRN: [id number]  Referring Physician: Referring Practitioner: Caleb Burns MD       Medical Diagnosis Information:   Diagnosis: M19.121 (ICD-10-CM) - Post-traumatic osteoarthritis of right elbow, M65.9 (ICD-10-CM) - Synovitis of elbow; G56.21 (ICD-10-CM) - Lesion of right ulnar nerve; M24.021 (ICD-10-CM) - Loose body in elbow joint, right   Treatment Diagnosis: OT:R elbow stiffness M25.621     Date of injury:fell of a horse in , surgery at that time, recently 2018, she re-injured   Date of Surgery/Type of procedure:      Elbow scope on 19                                Insurance information:OT Insurance Information:  / 116**- MED MUT - RHOAD - $6000DED - $0CP - 20PT - 100% NO AUTH   Comorbidities Affecting Functional Performance:     []Anxiety (F41.9)/Depression (F32.9)   []Diabetes Type 1(E10.65) or 2 (E11.65)   []Rheumatoid Arthritis (M05.9)  []Fibromyalgia (M79.7)  []Neuropathy(G60.9)  []Osteoarthritis(M19.91)  [x]None   []Other:    G-code:      64% QDASH SCORE APPLIED ON 19                             Date of Patient follow up with Physician:  Preferred Language for Healthcare:   [x]English       []other:  Latex Allergy:  [x]NO      []YES  RESTRICTIONS/PRECAUTIONS:   NONE  Progress Note: []  Yes  [x]  No  Next due by : Visit #10       Visit # Insurance Allowable Requires auth   2     no:[]                  yes:[]    From 19 to 6/3/2019    Pain level: 1/10     SUBJECTIVE: minimal pain today, does have a sharp pain occasionally with certain motions.       Patient reported deficits/history of current problem: fell of a horse in , surgery then, recently heard a pop playing tennis, a little bit of pain and stiffness, finished tennis season and then elected to have surgery. OBJECTIVE:   Date:  Hand Dominance:     []  Right    [] Left 5/20/2019    6/3/19    Objective Measures:      PAIN 6/10    Quick DASH 64%    Digits tip to DPFC in cm WNL    Thumb ROM WNL    Thumb opposition  WNL    Thumb Radial/Palmar abd ROM WNL:    Wrist ROM Ext/Flex R:WNL  L:    Rad/Uln dev ROM R:  L:    Forearm ROM  Sup/pron R:WNL  L:WNL    Elbow ROM Ext/flex R:35/110  L:+15/150 32/148   Shoulder Flex  Shoulder Abd  Shoulder IR/ER No deficits per patient    Edema in cm circumf.  elbow R:28.0  L:26.5       strength in lbs R:deferred  L:    Pinch Strengthin lbs: lat  R:deferred  L:    Pinch Strength in lbs:  3 point R:  L:       MMT:  NT      Modalities: 5/20/19   6/3/19    HP  - 10 min        Therapeutic Exercise & Activities:     ROM A/PROM ELBOW HEP         finisher  6#   scap stab exercise prone, supine  HEP-standard program   s/p  3 wts 30x (HEP)              Size E compression sleeve for elbow    Manual Therapy: (IASTM, Dry Needling, manual mobilization)            Therapeutic Exercise and NMR EXR  [x] (21638) Provided verbal/tactile cueing for activities related to strengthening, flexibility, endurance, ROM  for improvements in scapular, scapulothoracic and UE control with self care, reaching, carrying, lifting, house/yardwork, driving/computer work.    [] (25960) Provided verbal/tactile cueing for activities related to improving balance, coordination, kinesthetic sense, posture, motor skill, proprioception  to assist with  scapular, scapulothoracic and UE control with self care, reaching, carrying, lifting, house/yardwork, driving/computer work. Therapeutic Activities:    [] ( 83517) therapeutic activities, direct (one on one) patient contact. Use of dynamic activities to improve functional performance.     Activities of Daily Living:  [] (82629) Provided self-care/home management training (i.e., activities of daily living and compensatory training, meal preparation, safety procedures, and instructions in use of assistive technology devices/adaptive equipment)     Home Exercise Program:  5/20/19 copy In chart  [x] [phone]) Reviewed/Progressed HEP activities related to strengthening, flexibility, endurance, ROM of scapular, scapulothoracic and UE control with self care, reaching, carrying, lifting, house/yardwork, driving/computer work    Manual Treatments:   [] (54084) Provided manual therapy to mobilize soft tissue/joints of the UE for the purpose of modulating pain, promoting relaxation,  increasing ROM, reducing/eliminating soft tissue swelling/inflammation/restriction, improving soft tissue extensibility and allowing for proper ROM for normal function with self care, reaching, carrying, lifting, house/yardwork, driving/computer work    Splinting:  [] Fabrication of: L-code  [] (40548) Checkout for orthotic/prosthetic use, established patient   [] (25225) Orthotic management and training (fitting and assessment)  [] Comments:    Charges:  Timed Code Treatment Minutes: 38   Total Treatment Minutes: 48   [] EVAL (LOW) 39522   [] OT Re-eval (80570)  [] EVAL (MOD) 52035   [] EVAL (HIGH) 69233       [x] Beronica (26361) x   2 [] BYDFG(06637)  [] NMR (97941) x     [] Estim (attended) (50944)   [x] Manual ([id number]) x   1   [] US (43804)  [] TA (95044) x     [] Paraffin (11335)  [] ADL  ([phone]) x    [] Splint/L code:    [] Estim (unattended) (77288)  [] Other:  [] Fluidotherapy (73358)  [](12725) Checkout for orthotic use, established patient x      [] (82221) Orthotic mgmt & training  x       GOALS:Short Term Goals: To be achieved in: 2 weeks  1. Independent in HEP and progression per patient tolerance, in order to prevent re-injury.    2. Patient will have a decrease in pain to facilitate improvement in movement, function, and ADLs as indicated by Functional Deficits.     Long Term Goals to be achieved in 6  weeks, including patient directed goals to address identified performance deficits:  1) Pt to be independent in graded HEP progression with a good level of effort and compliance. 2) Pt to report a score of 30 % or less on the Quick DASH disability questionnaire for increased performance with carrying, moving, and handling objects. 3) Pt will demonstrate increased ROM to elbow  for improved ability to resume playing tennis, lift and carry her children. 4) Pt will demonstrate increased strength to  50% of right, MMT elbow flex functional for improved bility to resume playing tennis, lift and carry her childern. 5) Pt will have a decrease in pain to 2/10 to facilitate improvement in ability to resume tennis, complete all household duties including meal prep and cleaning. Progression Towards Functional goals:  [x] Patient is progressing as expected towards functional goals listed. [] Progression is slowed due to complexities listed. [] Progression has been slowed due to co-morbidities.   [] Plan just implemented, too soon to assess goals progression  [] Other:     ASSESSMENT: minimal pain, elbow is stiff into extension and was pre operatively     Treatment/Activity Tolerance:  [] Patient tolerated treatment well [] Patient limited by fatique  [] Patient limited by pain  [] Patient limited by other medical complications  [] Other:     Prognosis: [] Good [] Fair  [] Poor    Patient Requires Follow-up: [x] Yes  [] No    PLAN: Recommend Occupational Therapy 1 times a week for 5 weeks (encouraged her to increase to 2x/wk now so we can stretch and strengthen 6/3/19 )  [] Continue per plan of care [x] Alter current plan (encouraged her to increase to 2x/wk now so we can stretch and strengthen 6/3/19 )   [] Plan of care initiated [] Hold pending MD visit [] Discharge    Plan for next session: progress HEP, manual stretching to increase extension, strengthen shoulder and hand, progressively add elbow   Thermal modalities, functional activities and strengthening as inicated, AROM, PROM as indicated    Electronically signed by: Ely Larry OTAFUA/L, [address] [facility] [id number] no

## 2019-06-06 ENCOUNTER — HOSPITAL ENCOUNTER (OUTPATIENT)
Dept: OCCUPATIONAL THERAPY | Age: 39
Setting detail: THERAPIES SERIES
Discharge: HOME | End: 2019-06-06
Payer: COMMERCIAL

## 2019-06-06 PROCEDURE — 97110 THERAPEUTIC EXERCISES: CPT | Performed by: OCCUPATIONAL THERAPIST

## 2019-06-06 PROCEDURE — 97140 MANUAL THERAPY 1/> REGIONS: CPT | Performed by: OCCUPATIONAL THERAPIST

## 2019-06-06 NOTE — FLOWSHEET NOTE
and stiffness, finished tennis season and then elected to have surgery. OBJECTIVE:   Date:  Hand Dominance:     []  Right    [] Left 5/20/2019    6/3/19  6/6/19    Objective Measures:       PAIN 6/10 1/10    Quick DASH 64%     Digits tip to DPFC in cm WNL     Thumb ROM WNL     Thumb opposition  WNL     Thumb Radial/Palmar abd ROM WNL:     Wrist ROM Ext/Flex R:WNL  L:     Rad/Uln dev ROM R:  L:     Forearm ROM  Sup/pron R:WNL  L:WNL     Elbow ROM Ext/flex R:35/110  L:+15/150 32/148    Shoulder Flex  Shoulder Abd  Shoulder IR/ER No deficits per patient     Edema in cm circumf.  elbow R:28.0  L:26.5        strength in lbs R:deferred  L:  56  56     Pinch Strengthin lbs: lat  R:deferred  L:     Pinch Strength in lbs:  3 point R:  L:        MMT:  NT       Modalities: 5/20/19   6/3/19  6/6/19    HP  - 10 min 10         Therapeutic Exercise & Activities:      ROM A/PROM ELBOW HEP  Prom elbow extension, supine biceps stretch (added to HEP)         finisher  6#    scap stab exercise prone, supine  HEP-standard program    s/p  3 wts 30x (HEP) 3 wts   theraband   Yellow, biceps, triceps, scap (HEP)   putty   Yellow, mallet press    Size E compression sleeve for elbow     Manual Therapy: (IASTM, Dry Needling, manual mobilization)   Volar forearm mobs with passive elbow extension           Therapeutic Exercise and NMR EXR  [x] (89482) Provided verbal/tactile cueing for activities related to strengthening, flexibility, endurance, ROM  for improvements in scapular, scapulothoracic and UE control with self care, reaching, carrying, lifting, house/yardwork, driving/computer work.    [] (49599) Provided verbal/tactile cueing for activities related to improving balance, coordination, kinesthetic sense, posture, motor skill, proprioception  to assist with  scapular, scapulothoracic and UE control with self care, reaching, carrying, lifting, house/yardwork, driving/computer work.     Therapeutic Activities:    [] ( 39127) therapeutic activities, direct (one on one) patient contact. Use of dynamic activities to improve functional performance. Activities of Daily Living:  [] (84726) Provided self-care/home management training (i.e., activities of daily living and compensatory training, meal preparation, safety procedures, and instructions in use of assistive technology devices/adaptive equipment)     Home Exercise Program:  6/6/19  copy In chart  [x] [phone]) Reviewed/Progressed HEP activities related to strengthening, flexibility, endurance, ROM of scapular, scapulothoracic and UE control with self care, reaching, carrying, lifting, house/yardwork, driving/computer work    Manual Treatments:   [x] (52500) Provided manual therapy to mobilize soft tissue/joints of the UE for the purpose of modulating pain, promoting relaxation,  increasing ROM, reducing/eliminating soft tissue swelling/inflammation/restriction, improving soft tissue extensibility and allowing for proper ROM for normal function with self care, reaching, carrying, lifting, house/yardwork, driving/computer work    Splinting:  [] Fabrication of: L-code  [] (84541) Checkout for orthotic/prosthetic use, established patient   [] (08234) Orthotic management and training (fitting and assessment)  [] Comments:    Charges:  Timed Code Treatment Minutes: 38   Total Treatment Minutes: 48   [] EVAL (LOW) 76757   [] OT Re-eval (67712)  [] EVAL (MOD) 98535   [] EVAL (HIGH) 60680       [x] Beronica (87665) x   2 [] DCZNC(65503)  [] NMR (56348) x     [] Estim (attended) (80655)   [x] Manual ([id number]) x   1   [] US (45502)  [] TA (96001) x     [] Paraffin (75083)  [] ADL  (04 [phone]) x    [] Splint/L code:    [] Estim (unattended) (70246)  [] Other:  [] Fluidotherapy (70244)  [](28586) Checkout for orthotic use, established patient x      [] (73153) Orthotic mgmt & training  x       GOALS:Short Term Goals: To be achieved in: 2 weeks  1.  Independent in HEP and progression per patient tolerance, in order to prevent re-injury. 2. Patient will have a decrease in pain to facilitate improvement in movement, function, and ADLs as indicated by Functional Deficits.     Long Term Goals to be achieved in 6  weeks, including patient directed goals to address identified performance deficits:  1) Pt to be independent in graded HEP progression with a good level of effort and compliance. 2) Pt to report a score of 30 % or less on the Quick DASH disability questionnaire for increased performance with carrying, moving, and handling objects. 3) Pt will demonstrate increased ROM to elbow  for improved ability to resume playing tennis, lift and carry her children. 4) Pt will demonstrate increased strength to  50% of right, MMT elbow flex functional for improved bility to resume playing tennis, lift and carry her childern. 5) Pt will have a decrease in pain to 2/10 to facilitate improvement in ability to resume tennis, complete all household duties including meal prep and cleaning. Progression Towards Functional goals:  [x] Patient is progressing as expected towards functional goals listed. [] Progression is slowed due to complexities listed. [] Progression has been slowed due to co-morbidities.   [] Plan just implemented, too soon to assess goals progression  [] Other:     ASSESSMENT:      Treatment/Activity Tolerance:  [] Patient tolerated treatment well [] Patient limited by fatique  [] Patient limited by pain  [] Patient limited by other medical complications  [] Other:     Prognosis: [] Good [] Fair  [] Poor    Patient Requires Follow-up: [x] Yes  [] No    PLAN: Recommend Occupational Therapy 2 times a week for 3  weeks (encouraged her to increase to 2x/wk now so we can stretch and strengthen 6/3/19 )  [] Continue per plan of care [x] Alter current plan (encouraged her to increase to 2x/wk now so we can stretch and strengthen 6/3/19 )   [] Plan of care initiated [] Hold pending MD visit [] Discharge    Plan for next session: progress HEP, manual stretching to increase extension, strengthen shoulder and hand, progressively add elbow   Thermal modalities, functional activities and strengthening as inicated, AROM, PROM as indicated    Electronically signed by: Mohinder RANGEL/JOSÉ, CHT [id number]

## 2019-06-10 ENCOUNTER — HOSPITAL ENCOUNTER (OUTPATIENT)
Dept: OCCUPATIONAL THERAPY | Age: 39
Setting detail: THERAPIES SERIES
Discharge: HOME | End: 2019-06-10
Payer: COMMERCIAL

## 2019-06-10 PROCEDURE — 97110 THERAPEUTIC EXERCISES: CPT | Performed by: OCCUPATIONAL THERAPIST

## 2019-06-10 PROCEDURE — 97140 MANUAL THERAPY 1/> REGIONS: CPT | Performed by: OCCUPATIONAL THERAPIST

## 2019-06-10 NOTE — FLOWSHEET NOTE
[facility], [address] [place] [address] Sammy  Phone: [phone]  Fax [phone]  Hand Therapy Daily Treatment Note  Date:  6/10/2019    Patient: Cesar Leigh   : 1980   MRN: [id number]  Referring Physician: Referring Practitioner: Tc Garcia MD       Medical Diagnosis Information:   Diagnosis: M19.121 (ICD-10-CM) - Post-traumatic osteoarthritis of right elbow, M65.9 (ICD-10-CM) - Synovitis of elbow; G56.21 (ICD-10-CM) - Lesion of right ulnar nerve; M24.021 (ICD-10-CM) - Loose body in elbow joint, right   Treatment Diagnosis: OT:R elbow stiffness M25.621     Date of injury:fell of a horse in , surgery at that time, recently 2018, she re-injured   Date of Surgery/Type of procedure:      Elbow scope on 19                                Insurance information:OT Insurance Information:  / 116**- MED MUT - RHOAD - $6000DED - $0CP - 20PT - 100% NO AUTH   Comorbidities Affecting Functional Performance:     []Anxiety (F41.9)/Depression (F32.9)   []Diabetes Type 1(E10.65) or 2 (E11.65)   []Rheumatoid Arthritis (M05.9)  []Fibromyalgia (M79.7)  []Neuropathy(G60.9)  []Osteoarthritis(M19.91)   [x]None   []Other:    G-code:      64% QDASH SCORE APPLIED ON 19                             Date of Patient follow up with Physician:  Preferred Language for Healthcare:   [x]English       []other:  Latex Allergy:  [x]NO      []YES  RESTRICTIONS/PRECAUTIONS:   NONE  Progress Note: []  Yes  [x]  No  Next due by : Visit #10       Visit # Insurance Allowable Requires auth   4     no:[]                  yes:[]    From 19 to 6/10/2019    Pain level: 1/10     SUBJECTIVE:  No pain, occasional stiffness noted, has better elbow flexion than extension, and she feels like her ROM is as good as it was post operatively.      Patient reported deficits/history of current problem: fell of a horse in , surgery then, recently heard a pop playing tennis, a little bit of pain and stiffness, finished tennis season and then elected to have surgery.       OBJECTIVE:   Date:  Hand Dominance:     []  Right    [] Left 5/20/2019    6/3/19  6/6/19  6/10/19    Objective Measures:        PAIN 6/10 1/10     Quick DASH 64%      Digits tip to DPFC in cm WNL      Thumb ROM WNL      Thumb opposition  WNL      Thumb Radial/Palmar abd ROM WNL:      Wrist ROM Ext/Flex R:WNL  L:      Rad/Uln dev ROM R:  L:      Forearm ROM  Sup/pron R:WNL  L:WNL      Elbow ROM Ext/flex R:35/110  L:+15/150 32/148  23/150   Shoulder Flex  Shoulder Abd  Shoulder IR/ER No deficits per patient      Edema in cm circumf.  elbow R:28.0  L:26.5         strength in lbs R:deferred  L:  56  56 72  65     Pinch Strengthin lbs: lat  R:deferred  L:      Pinch Strength in lbs:  3 point R:  L:         MMT:  NT        Modalities: 5/20/19   6/3/19  6/6/19  6/10/19    HP  - 10 min 10 5 min + 5 min with elbow stretch into ext          Therapeutic Exercise & Activities:       ROM A/PROM ELBOW HEP  Prom elbow extension, supine biceps stretch (added to HEP) same   Flex bar    Green 20 x4 dir   finisher  6#  8#   scap stab exercise prone, supine  HEP-standard program     s/p  3 wts 30x (HEP) 3 wts 3 wts   theraband   Yellow, biceps, triceps, scap (HEP)    putty   Yellow, /mallet press Pink putty mallet    Size E compression sleeve for elbow  /    Manual Therapy: (IASTM, Dry Needling, manual mobilization)   Volar forearm mobs with passive elbow extension             Therapeutic Exercise and NMR EXR  [x] (80839) Provided verbal/tactile cueing for activities related to strengthening, flexibility, endurance, ROM  for improvements in scapular, scapulothoracic and UE control with self care, reaching, carrying, lifting, house/yardwork, driving/computer work.    [] (40034) Provided verbal/tactile cueing for activities related to improving balance, coordination, kinesthetic sense, posture, motor skill, proprioception  to assist with  scapular, scapulothoracic and UE control with self care, reaching, carrying, lifting, house/yardwork, driving/computer work. Therapeutic Activities:    [] ( 16666) therapeutic activities, direct (one on one) patient contact. Use of dynamic activities to improve functional performance.     Activities of Daily Living:  [] (10262) Provided self-care/home management training (i.e., activities of daily living and compensatory training, meal preparation, safety procedures, and instructions in use of assistive technology devices/adaptive equipment)     Home Exercise Program:  6/6/19  copy In chart  [] [phone]) Reviewed/Progressed HEP activities related to strengthening, flexibility, endurance, ROM of scapular, scapulothoracic and UE control with self care, reaching, carrying, lifting, house/yardwork, driving/computer work    Manual Treatments:   [x] (75775) Provided manual therapy to mobilize soft tissue/joints of the UE for the purpose of modulating pain, promoting relaxation,  increasing ROM, reducing/eliminating soft tissue swelling/inflammation/restriction, improving soft tissue extensibility and allowing for proper ROM for normal function with self care, reaching, carrying, lifting, house/yardwork, driving/computer work    Splinting:  [] Fabrication of: L-code  [] (16034) Checkout for orthotic/prosthetic use, established patient   [] (77529) Orthotic management and training (fitting and assessment)  [] Comments:    Charges:  Timed Code Treatment Minutes: 38   Total Treatment Minutes: 48   [] EVAL (LOW) 91205   [] OT Re-eval (80787)  [] EVAL (MOD) 18055   [] EVAL (HIGH) 79139       [x] Beronica (35525) x   2 [] WVORY(82842)  [] NMR (41874) x     [] Estim (attended) (23760)   [x] Manual ([id number]) x   1   [] US (45636)  [] TA (31102) x     [] Paraffin (02794)  [] ADL  (00 649 24 60) x    [] Splint/L code:    [] Estim (unattended) (69738)  [] Other:  [] Malorie (08254)  [](69353) Checkout for orthotic use, established patient x      [] (17019) Orthotic mgmt & training  x       GOALS:Short Term Goals: To be achieved in: 2 weeks  1. Independent in HEP and progression per patient tolerance, in order to prevent re-injury. 2. Patient will have a decrease in pain to facilitate improvement in movement, function, and ADLs as indicated by Functional Deficits.     Long Term Goals to be achieved in 6  weeks, including patient directed goals to address identified performance deficits:  1) Pt to be independent in graded HEP progression with a good level of effort and compliance. 2) Pt to report a score of 30 % or less on the Quick DASH disability questionnaire for increased performance with carrying, moving, and handling objects. 3) Pt will demonstrate increased ROM to elbow  for improved ability to resume playing tennis, lift and carry her children. 4) Pt will demonstrate increased strength to  50% of right, MMT elbow flex functional for improved bility to resume playing tennis, lift and carry her childern. 5) Pt will have a decrease in pain to 2/10 to facilitate improvement in ability to resume tennis, complete all household duties including meal prep and cleaning. Progression Towards Functional goals:  [x] Patient is progressing as expected towards functional goals listed. [] Progression is slowed due to complexities listed. [] Progression has been slowed due to co-morbidities.   [] Plan just implemented, too soon to assess goals progression  [] Other:     ASSESSMENT:      Treatment/Activity Tolerance:  [] Patient tolerated treatment well [] Patient limited by fatique  [] Patient limited by pain  [] Patient limited by other medical complications  [] Other:     Prognosis: [] Good [] Fair  [] Poor    Patient Requires Follow-up: [x] Yes  [] No    PLAN: Recommend Occupational Therapy 2 times a week for 3  weeks (encouraged her to increase to 2x/wk now so we can stretch and strengthen 6/3/19 )  [] Continue per plan of care [x] Alter current plan (encouraged her to increase to 2x/wk now so we can stretch and strengthen 6/3/19 )   [] Plan of care initiated [] Hold pending MD visit [] Discharge    Plan for next session: progress HEP, manual stretching to increase extension, strengthen shoulder and hand, progressively add elbow   Thermal modalities, functional activities and strengthening as inicated, AROM, PROM as indicated    Electronically signed by: Caio RANGEL/JOSÉ, CHT [id number]

## 2019-06-13 ENCOUNTER — APPOINTMENT (OUTPATIENT)
Dept: OCCUPATIONAL THERAPY | Age: 39
End: 2019-06-13
Payer: COMMERCIAL

## 2019-06-17 ENCOUNTER — APPOINTMENT (OUTPATIENT)
Dept: OCCUPATIONAL THERAPY | Age: 39
End: 2019-06-17
Payer: COMMERCIAL

## 2019-07-11 ENCOUNTER — OFFICE VISIT (OUTPATIENT)
Dept: ORTHOPEDIC SURGERY | Age: 39
End: 2019-07-11

## 2019-07-11 VITALS — HEIGHT: 62 IN | BODY MASS INDEX: 38.66 KG/M2 | WEIGHT: 210.1 LBS | RESPIRATION RATE: 16 BRPM

## 2019-07-11 DIAGNOSIS — M19.121 POST-TRAUMATIC OSTEOARTHRITIS OF RIGHT ELBOW: Primary | ICD-10-CM

## 2019-07-11 PROCEDURE — 99024 POSTOP FOLLOW-UP VISIT: CPT | Performed by: ORTHOPAEDIC SURGERY

## (undated) DEVICE — 3M™ COBAN™ NL STERILE NON-LATEX SELF-ADHERENT WRAP, 2084S, 4 IN X 5 YD (10 CM X 4,5 M), 18 ROLLS/CASE: Brand: 3M™ COBAN™

## (undated) DEVICE — 1200CC HIFLOW SUCTION CANISTER WITH AEROSTAT FILTER, FLOAT VALVE SHUTOFF WITH GREEN LID: Brand: BEMIS

## (undated) DEVICE — SET GRAV VENT NVENT CK VLV 3 NDL FREE PRT 10 GTT

## (undated) DEVICE — GLOVE SURG SZ 75 L12IN FNGR THK94MIL STD WHT LTX FREE

## (undated) DEVICE — PAD CAST COTTON BLEND ST PKG 4INX4YD

## (undated) DEVICE — SOLUTION IV 1000ML LAC RINGERS PH 6.5 INJ USP VIAFLX PLAS

## (undated) DEVICE — 1010 S-DRAPE TOWEL DRAPE 10/BX: Brand: STERI-DRAPE™

## (undated) DEVICE — SKIN MARKER,REGULAR TIP WITH RULER AND LABELS: Brand: DEVON

## (undated) DEVICE — COMFO-TEX ELASTIC BANDAGE LATEX FREE, 3INX5YD: Brand: COMFO-TEX™

## (undated) DEVICE — SET ADMIN PRIMING 7ML L30IN 7.35LB 20 GTT 2ND RLER CLMP

## (undated) DEVICE — DRAPE ARTHRO 90X124IN KNEE SMS FAB EXT FLD COLL PCH RESIST

## (undated) DEVICE — PADDING CAST W4INXL4YD NONSTERILE COT RAYON MICROPLEATED

## (undated) DEVICE — TOWEL,OR,DSP,ST,BLUE,STD,8/PK,10PK/CS: Brand: MEDLINE

## (undated) DEVICE — 4.5 MM INCISOR PLUS STRAIGHT                                    BLADES, POWER/EP-1, VIOLET, PACKAGED                                    6 PER BOX, STERILE

## (undated) DEVICE — OCCLUSIVE GAUZE STRIP,3% BISMUTH TRIBROMOPHENATE IN PETROLATUM BLEND: Brand: XEROFORM

## (undated) DEVICE — ELECTRODE PT RET AD L9FT HI MOIST COND ADH HYDRGEL CORDED

## (undated) DEVICE — COMFO-TEX ELASTIC BANDAGE LATEX FREE, 4INX5YD: Brand: COMFO-TEX™

## (undated) DEVICE — GOWN,SIRUS,NON REINFRCD,LARGE,SET IN SL: Brand: MEDLINE

## (undated) DEVICE — SOLUTION IV IRRIG 500ML 0.9% SODIUM CHL 2F7123

## (undated) DEVICE — SPLINT ORTH W4XL30IN LAYERED FBRGLS FOAM PD BRTH BK MOLD

## (undated) DEVICE — ABDOMINAL PAD: Brand: DERMACEA

## (undated) DEVICE — 4.0 MM ABRADER STRAIGHT BURRS,                                    POWER/EP-1, AQUA, 8000 MAXIMUM RPM,                                    PACKAGED 6 PER BOX, STERILE

## (undated) DEVICE — PAD,ABDOMINAL,8"X10",ST,LF: Brand: MEDLINE

## (undated) DEVICE — 4-PORT MANIFOLD: Brand: NEPTUNE 2

## (undated) DEVICE — SUTURE MCRYL SZ 3-0 L27IN ABSRB UD L26MM SH 1/2 CIR Y416H

## (undated) DEVICE — CATHETER IV 20GA L1.25IN PNK FEP SFTY STR HUB RADPQ DISP

## (undated) DEVICE — CHLORAPREP 26ML ORANGE

## (undated) DEVICE — SOLUTION IRRIG 5L LAC R BG

## (undated) DEVICE — PACK PROCEDURE SURG ARTHSCP CUST

## (undated) DEVICE — ZIMMER® STERILE DISPOSABLE TOURNIQUET CUFF WITH PLC, DUAL PORT, SINGLE BLADDER, 18 IN. (46 CM)

## (undated) DEVICE — TUBING PMP L8FT LNG W/ CONN FOR AR-6400 REDEUCE

## (undated) DEVICE — 3M™ TEGADERM™ TRANSPARENT FILM DRESSING FRAME STYLE, 1624W, 2-3/8 IN X 2-3/4 IN (6 CM X 7 CM), 100/CT 4CT/CASE: Brand: 3M™ TEGADERM™

## (undated) DEVICE — AMBIENT SUPER TURBOVAC 90: Brand: COBLATION

## (undated) DEVICE — SUTURE ETHLN SZ 4-0 L18IN NONABSORBABLE BLK L19MM PS-2 3/8 1667H

## (undated) DEVICE — Device

## (undated) DEVICE — SPONGE GZ W4XL4IN COT USP TYP VII 8 PLY EZ OPN ENV

## (undated) DEVICE — TUBE IRRIG L8IN LNG PT W/ CONN FOR PMP SYS REDEUCE

## (undated) DEVICE — MEDI-VAC NON-CONDUCTIVE SUCTION TUBING: Brand: CARDINAL HEALTH